# Patient Record
Sex: MALE | Race: OTHER | NOT HISPANIC OR LATINO | URBAN - METROPOLITAN AREA
[De-identification: names, ages, dates, MRNs, and addresses within clinical notes are randomized per-mention and may not be internally consistent; named-entity substitution may affect disease eponyms.]

---

## 2023-01-26 ENCOUNTER — EMERGENCY (EMERGENCY)
Facility: HOSPITAL | Age: 88
LOS: 1 days | Discharge: HOME | End: 2023-01-26
Admitting: STUDENT IN AN ORGANIZED HEALTH CARE EDUCATION/TRAINING PROGRAM
Payer: MEDICARE

## 2023-01-26 VITALS
OXYGEN SATURATION: 97 % | WEIGHT: 158.73 LBS | HEART RATE: 78 BPM | DIASTOLIC BLOOD PRESSURE: 74 MMHG | HEIGHT: 66 IN | RESPIRATION RATE: 16 BRPM | TEMPERATURE: 99 F | SYSTOLIC BLOOD PRESSURE: 159 MMHG

## 2023-01-26 DIAGNOSIS — U07.1 COVID-19: ICD-10-CM

## 2023-01-26 DIAGNOSIS — Z20.822 CONTACT WITH AND (SUSPECTED) EXPOSURE TO COVID-19: ICD-10-CM

## 2023-01-26 PROCEDURE — 87637 SARSCOV2&INF A&B&RSV AMP PRB: CPT

## 2023-01-26 PROCEDURE — 99284 EMERGENCY DEPT VISIT MOD MDM: CPT

## 2023-01-26 PROCEDURE — 99282 EMERGENCY DEPT VISIT SF MDM: CPT

## 2023-01-26 NOTE — ED ADULT NURSE NOTE - OBJECTIVE STATEMENT
87y male c/o +covid test. states, "I take a covid test once a week and today it was positive. I think it might have been false." pt rpts mild fatigue x 1 day. takes crestor daily. denies f/c, CP, SOB, n/v/d, HA, dizziness, numbness/tingling. Patient is awake and alert. Alert and oriented x 4. Respirations even and unlabored. speaking in clear, full sentences. No acute distress noted at this time.

## 2023-01-27 NOTE — ED PROVIDER NOTE - PATIENT PORTAL LINK FT
You can access the FollowMyHealth Patient Portal offered by [facility] by registering at the following website: [email or web address]. By joining [facility]’s FollowMyHealth portal, you will also be able to view your health information using other applications (apps) compatible with our system.

## 2023-01-27 NOTE — ED PROVIDER NOTE - NSFOLLOWUPINSTRUCTIONS_ED_ALL_ED_FT
COVID-19      COVID-19, or coronavirus disease 2019, is a systemic infection that is caused by a novel coronavirus called SARS-CoV-2. In some people, the virus may not cause any symptoms. In others, it may cause mild or severe symptoms. Some people with severe infection develop severe disease, which may lead to acute respiratory distress syndrome and shock.      What are the causes?  The human body, showing how the coronavirus travels from the air to a person's lungs.   This illness is caused by a virus. The virus may be in the air or on surfaces as droplets or aerosols of various sizes. You may catch the virus by:  •Breathing in droplets from an infected person. Droplets can be spread by a person breathing, speaking, singing, coughing, or sneezing.      •Touching something, like a table or a doorknob, that was exposed to the virus (is contaminated) and then touching your mouth, nose, or eyes.        What increases the risk?    Risk for infection:     You are more likely to become infected with the COVID-19 virus if:  •You are within 6 ft (1.8 m) of a person with COVID-19 for 15 minutes or longer.      •You are providing care for a person who is infected with COVID-19.      •You are in close personal contact with other people. Close personal contact includes hugging, kissing, or sharing eating or drinking utensils.      Risk for serious illness due to COVID-19:    You are more likely to become seriously ill from the COVID-19 virus if:  •You have cancer.    •You have a long-term (chronic) disease, such as:  •Chronic lung disease, including pulmonary embolism, chronic obstructive pulmonary disease, and cystic fibrosis.      •Long-term disease that lowers your body's ability to fight infection (immunocompromise).      •Serious cardiac conditions, such as heart failure, coronary artery disease, or cardiomyopathy.      •Diabetes.      •Chronic kidney disease.      •Liver diseases, including cirrhosis, nonalcoholic fatty liver disease, alcoholic liver disease, or autoimmune hepatitis.        •You are obese.      •You are pregnant or recently pregnant.      •You have sickle cell disease.        What are the signs or symptoms?    Symptoms of this condition can range from mild to severe. Symptoms may appear any time from 2 to 14 days after being exposed to the virus. They include:  •Fever or chills.      •Difficulty breathing or having shortness of breath.      •Feeling tired or very tired.      •Headaches, body aches, or muscle aches.      •Runny or stuffy nose, sneezing, cough, sore throat.      •New loss of taste or smell. This is rare.      Some people may also have stomach problems, such as nausea, vomiting, or diarrhea.    Other people may not have any symptoms of COVID-19.      How is this diagnosed?  A sample being collected by swabbing the nose.   This condition may be diagnosed by testing samples to check for the COVID-19 virus. The most common tests are the PCR test and the antigen test. Tests may be done in the lab or at home. They include:  •Using a swab to take a sample of fluid from the back of your nose and throat (nasopharyngeal fluid), from your nose, or from your throat.      •Testing a sample of saliva from your mouth.      •Testing a sample of coughed-up mucus from your lungs (sputum).        How is this treated?    Treatment for COVID-19 infection depends on the severity of the condition.  •Mild symptoms can be managed at home with rest, fluids, and over-the-counter medicines.    •Serious symptoms may be treated in a hospital intensive care unit, or ICU. Treatment in the ICU may include:  •Supplemental oxygen. Extra oxygen is given through a tube in the nose, a face mask, or a scott.    •Medicines. You may be given medicines:  •To help your body fight off certain viruses that can cause disease (antivirals).      •To reduce inflammation and suppress the immune system (corticosteroids).      •To prevent or treat blood clots, if they develop (antithrombotics).        •Antibodies made in a lab that can restore or strengthen your body's natural immune system (monoclonal antibody).      •Positioning you to lie on your stomach (prone position). This makes it easier for oxygen to get into the lungs.      •Infection control measures.        If you are at risk for more serious illness due to COVID-19, your health care provider may prescribe a combination of two long-acting monoclonal antibodies, administered together every 6 months.      How is this prevented?    To protect yourself:   •Get vaccinated. COVID-19 vaccines are available for everyone aged 6 months and older.  •Vaccination is recommended for women who are pregnant, may become pregnant, or are breastfeeding.      •Patients who require major surgery should plan their vaccination for several days before or after the surgery.      •Talk to your health care provider about receiving experimental monoclonal antibodies. This treatment has been approved under emergency use authorization to prevent severe illness before or after you are exposed to the COVID-19 virus. You may be given monoclonal antibodies if:  •You are moderately or severely immunocompromised. This includes treatments that lower your immune response. People with immunocompromise may not develop protection against COVID-19 when they are vaccinated.      •You cannot be vaccinated. You may not receive a vaccine if you have a severe allergic reaction to the vaccine or its components.      •You are not fully vaccinated.      •You are in close contact with a person who is infected with SARS-CoV-2, or at high risk of exposure to SARS-CoV-2, in an institution in which COVID-19 is occurring.      •You are at risk of illness from new variants of the COVID-19 virus.        To protect others:     If you have symptoms of COVID-19, take steps to prevent the virus from spreading to others.  •Stay home. Leave your house only to seek medical care. Do not use public transport, if possible.      •Do not travel while you are sick.      •Wash your hands often with soap and water for at least 20 seconds. If soap and water are not available, use alcohol-based hand .      •Stay away from other members of your household. Let healthy household members care for children and pets, if possible. If you have to care for children or pets, wash your hands often and wear a mask. If possible, stay in your own room, separate from others. Use a different bathroom.      •Make sure that all people in your household wash their hands well and often.      •Cough or sneeze into a tissue or your sleeve or elbow. Do not cough or sneeze into your hand or into the air.        Where to find more information    •Centers for Disease Control and Prevention: www.cdc.gov/coronavirus      •World Health Organization: www.who.int/health-topics/coronavirus        Get help right away if:    •You have trouble breathing.      •You have pain or pressure in your chest.      •You have confusion.      •You have bluish lips and fingernails.      •You have difficulty waking from sleep.      •You have symptoms that get worse.      These symptoms may be an emergency. Get help right away. Call 911.   • Do not wait to see if the symptoms will go away.        •  Do not drive yourself to the hospital.         Summary    •COVID-19 is a respiratory infection that is caused by a virus.      •Some people with a severe COVID-19 infection develop severe disease, which may lead to acute respiratory distress syndrome and shock.      •The virus that causes COVID-19 is contagious. This means that it can spread from person to person through droplets from breathing, speaking, singing, coughing, or sneezing.      •Mild symptoms of COVID-19 can be managed at home with rest, fluids, and over-the-counter medicines.      This information is not intended to replace advice given to you by your health care provider. Make sure you discuss any questions you have with your health care provider.

## 2023-01-27 NOTE — ED PROVIDER NOTE - OBJECTIVE STATEMENT
Patient is presenting to the Emergency Department with a request to have COVID-19 testing.  Denies symptoms, including cough, shortness of breath, fever, chills, body aches or sore throat.  Pt states he tested covid19+ on home test.